# Patient Record
Sex: FEMALE
[De-identification: names, ages, dates, MRNs, and addresses within clinical notes are randomized per-mention and may not be internally consistent; named-entity substitution may affect disease eponyms.]

---

## 2020-12-15 PROBLEM — Z00.00 ENCOUNTER FOR PREVENTIVE HEALTH EXAMINATION: Status: ACTIVE | Noted: 2020-12-15

## 2020-12-17 ENCOUNTER — LABORATORY RESULT (OUTPATIENT)
Age: 20
End: 2020-12-17

## 2020-12-17 ENCOUNTER — APPOINTMENT (OUTPATIENT)
Dept: SURGERY | Facility: CLINIC | Age: 20
End: 2020-12-17
Payer: MEDICAID

## 2020-12-17 VITALS
OXYGEN SATURATION: 98 % | WEIGHT: 141 LBS | HEIGHT: 60 IN | DIASTOLIC BLOOD PRESSURE: 69 MMHG | SYSTOLIC BLOOD PRESSURE: 100 MMHG | BODY MASS INDEX: 27.68 KG/M2 | HEART RATE: 75 BPM

## 2020-12-17 DIAGNOSIS — Z83.3 FAMILY HISTORY OF DIABETES MELLITUS: ICD-10-CM

## 2020-12-17 PROCEDURE — 19000 PUNCTURE ASPIR CYST BREAST: CPT

## 2020-12-17 PROCEDURE — 99203 OFFICE O/P NEW LOW 30 MIN: CPT | Mod: 25

## 2020-12-17 PROCEDURE — 99072 ADDL SUPL MATRL&STAF TM PHE: CPT

## 2020-12-17 NOTE — HISTORY OF PRESENT ILLNESS
[de-identified] : Patient is a 20 year  year-old female  who was referred by Dr. Richard Mercedes  with the chief complaint of having a  Right  breast mass for about  2-3 months. She  denies any trauma and She has no nipple discharge. She  denies any fever, night sweats or loss of appetite.    There is no family history of breast carcinoma.   Menarche at age 12 -0 para-0 and her last menstrual period was in 2020 Patient is on no hormonal replacement therapy.    She had a   sonogram of the right   breast on 2020 it was deemed a BIRADS 4.  Right breast ultrasound-guided core needle biopsy of the mass at 8-9:00.  Probably benign right breast mass at 6:00 will need follow-up in 6 months, with final recommendations pending biopsy results [de-identified] :  \par

## 2020-12-17 NOTE — PHYSICAL EXAM
[Alert] : alert [Oriented to Person] : oriented to person [Oriented to Place] : oriented to place [Oriented to Time] : oriented to time [Calm] : calm [de-identified] : She  is alert, well-groomed, and cheerful.\par   [de-identified] : anicteric.  Nasal mucosa pink, septum midline. Oral mucosa pink.  Tongue midline, Pharynx without exudates.\par   [de-identified] : Neck supple. Trachea midline. Thyroid isthmus barely palpable, lobes not felt.\par   [de-identified] :   symmetric breasts with no nipple or skin retraction.  On palpation of her breasts, there is a  right  10 o' clock palpable mass   .   No palpable masses on the left side.  \par Mass is - mobile, Firm,   Smooth,  Well defined.  No palpable lymph nodes.  No palpable supraclavicular masses. Axillas are benign/have palpable nodes.

## 2020-12-17 NOTE — PLAN
[FreeTextEntry1] : Ms. DANIELS is here with palpable right breast mass. She   was told significance of findings, options, risks and benefits were explained.  Informed consent for FNA right breast mass and potential risks, benefits and alternatives (surgical options were discussed including non-surgical options or the option of no surgery) to the planned procedure were discussed in depth.  All surgical options were discussed including non-surgical treatments.  She wishes to proceed with FNA.    4 ml green colored fluid aspirated. no palpable mass after the aspiration.  she tolerated well . specimen sent to cytology. \par Patient advised to seek immediate medical attention with any acute change in symptoms or with the development of any new or worsening symptoms.  Patient's questions and concerns addressed to patient's satisfaction, and patient verbalized an understanding of the information discussed.\par \par

## 2020-12-17 NOTE — CONSULT LETTER
[Dear  ___] : Dear  [unfilled], [Consult Letter:] : I had the pleasure of evaluating your patient, [unfilled]. [Please see my note below.] : Please see my note below. [Consult Closing:] : Thank you very much for allowing me to participate in the care of this patient.  If you have any questions, please do not hesitate to contact me. [Sincerely,] : Sincerely, [FreeTextEntry3] : Asher Bragg MD, FACS

## 2020-12-17 NOTE — DATA REVIEWED
[FreeTextEntry1] : CARTER DANIELS\par Date of Birth\par 2000\par Procedure\par US DIAGNOSTIC BREAST RIGHT\par Study Date & Time\par 2020-11-18 12:49 PM\par Patient ID\par [id number]\par Accession Number\par [id number]\par Referring Physician\par RONDA RAMOS\par Institution Name\par [facility]\par Report\par RADIOLOGY REPORT\par FINDINGS\par FINDING\par BREAST ULTRASOUND\par Clinical History: 20-year-old woman with palpable lump in the right breast.\par Comparison: None available\par Technique: Complete right breast ultrasound was performed, including all 4 quadrants, retroareolar\par region and the axilla.\par Findings:\par The breast parenchyma is heterogeneously glandular.\par Right breast:\par 6:00 N1, 0.4 x 0.2 x 0.5 cm circumscribed parallel hypoechoic mass, probably benign.\par 8-9:00 N3, 2.9 x 2.5 x 3.4 cm irregular hypoechoic mass with internal cystic focus, indeterminate.\par Normal axillary lymph nodes.\par IMPRESSION:\par Indeterminate right breast mass at 8-9:00\par Probably benign right breast mass at 6:00\par RECOMMENDATION:\par Right breast ultrasound-guided core needle biopsy of the mass at 8-9:00.\par Probably benign right breast mass at 6:00 will need follow-up in 6 months, with final\par recommendations pending biopsy results.\par BI-RADS: 4 - Suspicious abnormality - Biopsy Should Be Considered.\par 12/15/2020 [employer] [employer]\par [email or web address]\par The above findings and recommendations were relayed to Anjana in the office of Dr. Ramos by\par telephone on: 11/18/2020 1:35 PM.\par Electronically signed by: Kaveh Anne MD 11/18/2020 2:14 PM\par Workstation: [id number]\par Electronically Signed By: Kaveh Anne MD\par Sign Date: 18-NOV-20\[facility] Radiology

## 2021-01-21 ENCOUNTER — APPOINTMENT (OUTPATIENT)
Dept: SURGERY | Facility: CLINIC | Age: 21
End: 2021-01-21
Payer: MEDICAID

## 2021-01-21 VITALS
SYSTOLIC BLOOD PRESSURE: 109 MMHG | BODY MASS INDEX: 27.48 KG/M2 | DIASTOLIC BLOOD PRESSURE: 74 MMHG | HEART RATE: 83 BPM | WEIGHT: 140 LBS | HEIGHT: 60 IN

## 2021-01-21 PROCEDURE — 99072 ADDL SUPL MATRL&STAF TM PHE: CPT

## 2021-01-21 PROCEDURE — 99213 OFFICE O/P EST LOW 20 MIN: CPT | Mod: 25

## 2021-01-21 PROCEDURE — 19000 PUNCTURE ASPIR CYST BREAST: CPT

## 2021-01-21 NOTE — PHYSICAL EXAM
[Alert] : alert [Oriented to Person] : oriented to person [Oriented to Place] : oriented to place [Oriented to Time] : oriented to time [Calm] : calm [de-identified] : She  is alert, well-groomed, and cheerful.\par   [de-identified] : anicteric.  Nasal mucosa pink, septum midline. Oral mucosa pink.  Tongue midline, Pharynx without exudates.\par   [de-identified] : Neck supple. Trachea midline. Thyroid isthmus barely palpable, lobes not felt.\par   [de-identified] :   symmetric breasts with no nipple or skin retraction.  On palpation of her breasts, there is a  right  10 o' clock palpable mass   .   No palpable masses on the left side.  \par Mass is - mobile, Firm,   Smooth,  Well defined.  No palpable lymph nodes.  No palpable supraclavicular masses. Axillas are benign/have palpable nodes.

## 2021-01-21 NOTE — PLAN
[FreeTextEntry1] : Ms. DANIELS is here with palpable  recurrent right breast cyst . She was told significance of findings, options, risks and benefits were explained. Informed consent for FNA right breast mass and potential risks, benefits and alternatives (surgical options were discussed including non-surgical options or the option of no surgery) to the planned procedure were discussed in depth. All surgical options were discussed including non-surgical treatments. She wishes to proceed with FNA. 9   ml green colored fluid aspirated. no palpable residual mass after the aspiration. she tolerated well.  \par Patient advised to seek immediate medical attention with any acute change in symptoms or with the development of any new or worsening symptoms.  Patient's questions and concerns addressed to patient's satisfaction, and patient verbalized an understanding of the information discussed. patient will follow up in 2  months or if needed.  \par  \par Vitamin E daily for breast pain \par Avoid caffein and Chocolates to prevent breast pain

## 2021-01-21 NOTE — HISTORY OF PRESENT ILLNESS
[de-identified] : Patient is a 20 year year-old female who was referred by Dr. Richard Mercedes with the chief complaint of having a Right breast mass on 2020. Ms. DANIELS  is s/p FNA right breast cyst. Patient's  cytology results were  consistent with breast cyst contents, negative for malignancy.  she is returning today with cc of having a cyst recurrence in her right breast. she reports pain \par \par  Menarche at age 12 -0 para-0 and her last menstrual period was in 2020 Patient is on no hormonal replacement therapy. She had a sonogram of the right breast on 2020 it was deemed a BIRADS 4. Right breast ultrasound-guided core needle biopsy of the mass at 8-9:00. Probably benign right breast mass at 6:00 will need follow-up in 6 months, with final recommendations pending biopsy results.

## 2021-01-21 NOTE — DATA REVIEWED
[FreeTextEntry1] : CARTER DANIELS\par Date of Birth\par 2000\par Procedure\par US DIAGNOSTIC BREAST RIGHT\par Study Date & Time\par 2020-11-18 12:49 PM\par Patient ID\par [id number]\par Accession Number\par [id number]\par Referring Physician\par RONDA RAMOS\par Institution Name\par [facility]\par Report\par RADIOLOGY REPORT\par FINDINGS\par FINDING\par BREAST ULTRASOUND\par Clinical History: 20-year-old woman with palpable lump in the right breast.\par Comparison: None available\par Technique: Complete right breast ultrasound was performed, including all 4 quadrants, retroareolar\par region and the axilla.\par Findings:\par The breast parenchyma is heterogeneously glandular.\par Right breast:\par 6:00 N1, 0.4 x 0.2 x 0.5 cm circumscribed parallel hypoechoic mass, probably benign.\par 8-9:00 N3, 2.9 x 2.5 x 3.4 cm irregular hypoechoic mass with internal cystic focus, indeterminate.\par Normal axillary lymph nodes.\par IMPRESSION:\par Indeterminate right breast mass at 8-9:00\par Probably benign right breast mass at 6:00\par RECOMMENDATION:\par Right breast ultrasound-guided core needle biopsy of the mass at 8-9:00.\par Probably benign right breast mass at 6:00 will need follow-up in 6 months, with final\par recommendations pending biopsy results.\par BI-RADS: 4 - Suspicious abnormality - Biopsy Should Be Considered.\par 12/15/2020 [employer]\par [email or web address] 2/[email or web address]\par The above findings and recommendations were relayed to Anjana in the office of Dr. Ramos by\par telephone on: 11/18/2020 1:35 PM.\par Electronically signed by: Kaveh Anne MD 11/18/2020 2:14 PM\par Workstation: [id number]\par Electronically Signed By: Kaveh Anne MD\par Sign Date: 18-NOV-20\[facility] Radiology

## 2021-03-18 ENCOUNTER — APPOINTMENT (OUTPATIENT)
Dept: SURGERY | Facility: CLINIC | Age: 21
End: 2021-03-18
Payer: MEDICAID

## 2021-03-18 VITALS
DIASTOLIC BLOOD PRESSURE: 55 MMHG | WEIGHT: 139 LBS | HEART RATE: 66 BPM | HEIGHT: 60 IN | BODY MASS INDEX: 27.29 KG/M2 | SYSTOLIC BLOOD PRESSURE: 91 MMHG

## 2021-03-18 DIAGNOSIS — N63.10 UNSPECIFIED LUMP IN THE RIGHT BREAST, UNSPECIFIED QUADRANT: ICD-10-CM

## 2021-03-18 PROCEDURE — 99072 ADDL SUPL MATRL&STAF TM PHE: CPT

## 2021-03-18 PROCEDURE — 10060 I&D ABSCESS SIMPLE/SINGLE: CPT

## 2021-03-18 PROCEDURE — 99213 OFFICE O/P EST LOW 20 MIN: CPT | Mod: 25

## 2021-03-18 RX ORDER — CEPHALEXIN 500 MG/1
500 CAPSULE ORAL 3 TIMES DAILY
Qty: 21 | Refills: 0 | Status: ACTIVE | COMMUNITY
Start: 2021-03-18 | End: 1900-01-01

## 2021-03-18 NOTE — VITALS
[de-identified] : 8/10 [FreeTextEntry3] : right breast  [FreeTextEntry1] : nothing  [FreeTextEntry2] : touching

## 2021-03-18 NOTE — HISTORY OF PRESENT ILLNESS
[de-identified] : Patient is a 20 year year-old female who was referred by Dr. Richard Mercedes with the chief complaint of having a Right breast mass on 2020. Ms. DANIELS  is s/p FNA right breast cyst. Patient's  cytology results were  consistent with breast cyst contents, negative for malignancy.  she is returning today with  a painful lump  in her right breast. she reports pain. \par \par  Menarche at age 12 -0 para-0 and her last menstrual period was in 2020 Patient is on no hormonal replacement therapy. She had a sonogram of the right breast on 2020 it was deemed a BIRADS 4. Right breast ultrasound-guided core needle biopsy of the mass at 8-9:00. Probably benign right breast mass at 6:00 will need follow-up in 6 months, with final recommendations pending biopsy results.

## 2021-03-18 NOTE — PHYSICAL EXAM
[Alert] : alert [Oriented to Person] : oriented to person [Oriented to Place] : oriented to place [Oriented to Time] : oriented to time [Calm] : calm [de-identified] : She  is alert, well-groomed, and cheerful.\par   [de-identified] : anicteric.  Nasal mucosa pink, septum midline. Oral mucosa pink.  Tongue midline, Pharynx without exudates.\par   [de-identified] : Neck supple. Trachea midline. Thyroid isthmus barely palpable, lobes not felt.\par   [de-identified] :   symmetric breasts with no nipple or skin retraction.  On palpation of her breasts, there is a  right  7 o' clock palpable mass   .   No palpable masses on the left side.  \par Mass is - mobile, Firm,   Smooth,  Well defined.  No palpable lymph nodes.  No palpable supraclavicular masses. Axillas are benign/have palpable nodes.

## 2021-03-18 NOTE — PLAN
[FreeTextEntry1] : Ms. DANIELS  was told significance of findings, options, risks and benefits were explained.    All surgical options were discussed including non-surgical treatments.   abscess was aspirated using 18 g needle. sent for cultures. she will return after one week.  Po keflex for 1 week  \par Patient advised to seek immediate medical attention with any acute change in symptoms or with the development of any new or worsening symptoms.  Patient's questions and concerns addressed to patient's satisfaction, and patient verbalized an understanding of the information discussed.\par \par

## 2021-03-25 ENCOUNTER — APPOINTMENT (OUTPATIENT)
Dept: SURGERY | Facility: CLINIC | Age: 21
End: 2021-03-25
Payer: MEDICAID

## 2021-03-25 VITALS
WEIGHT: 139 LBS | SYSTOLIC BLOOD PRESSURE: 85 MMHG | HEIGHT: 60 IN | BODY MASS INDEX: 27.29 KG/M2 | HEART RATE: 60 BPM | DIASTOLIC BLOOD PRESSURE: 50 MMHG

## 2021-03-25 LAB — BACTERIA WND CULT: ABNORMAL

## 2021-03-25 PROCEDURE — 99024 POSTOP FOLLOW-UP VISIT: CPT

## 2021-03-25 NOTE — PHYSICAL EXAM
[Alert] : alert [Oriented to Person] : oriented to person [Oriented to Place] : oriented to place [Oriented to Time] : oriented to time [Calm] : calm [de-identified] : She  is alert, well-groomed, and cheerful.\par   [de-identified] : anicteric.  Nasal mucosa pink, septum midline. Oral mucosa pink.  Tongue midline, Pharynx without exudates.\par   [de-identified] : Neck supple. Trachea midline. Thyroid isthmus barely palpable, lobes not felt.\par   [de-identified] :   symmetric breasts with no nipple or skin retraction.  On palpation of her breasts, there is a  right  7 o' clock palpable mass. Red slightly   .   No palpable masses on the left side.  \par Mass is - mobile, Firm,   Smooth,  Well defined.  No palpable lymph nodes.  No palpable supraclavicular masses. Axillas are benign/have palpable nodes.

## 2021-03-25 NOTE — HISTORY OF PRESENT ILLNESS
[de-identified] : Patient is a 20 year year-old female who was referred by Dr. Richard Mercedes with the chief complaint of having a Right breast mass on 2020. Ms. DANIELS  is s/p FNA right breast cyst. Patient's  cytology results were  consistent with breast cyst contents, negative for malignancy. abscess was aspirated using 18 g needle. sent for cultures. she will return after one week. Po keflex for 1 week . 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 Susceptibilities not performed.  today she returns with a cc of having increase in  redness in the right breast with small amount of  purulent drainage.  No fever or chills \par PERTINENT HISTORY: \par  Menarche at age 12 -0 para-0 and her last menstrual period was in 2020 Patient is on no hormonal replacement therapy. She had a sonogram of the right breast on 2020 it was deemed a BIRADS 4. Right breast ultrasound-guided core needle biopsy of the mass at 8-9:00. Probably benign right breast mass at 6:00 will need follow-up in 6 months, with final recommendations pending biopsy results.

## 2021-03-25 NOTE — PLAN
[FreeTextEntry1] : Ms. DANIELS  was told significance of findings, options, risks and benefits were explained.    All surgical options were discussed including non-surgical treatments.   cultures reviewed in detail. patient was advised to take Augmentin for 10 days BID and return in 2 weeks. \par Patient advised to seek immediate medical attention with any acute change in symptoms or with the development of any new or worsening symptoms.  Patient's questions and concerns addressed to patient's satisfaction, and patient verbalized an understanding of the information discussed.\par \par

## 2021-04-08 ENCOUNTER — APPOINTMENT (OUTPATIENT)
Dept: SURGERY | Facility: CLINIC | Age: 21
End: 2021-04-08

## 2021-04-15 ENCOUNTER — APPOINTMENT (OUTPATIENT)
Dept: SURGERY | Facility: CLINIC | Age: 21
End: 2021-04-15
Payer: MEDICAID

## 2021-04-15 VITALS
BODY MASS INDEX: 27.29 KG/M2 | HEART RATE: 90 BPM | SYSTOLIC BLOOD PRESSURE: 105 MMHG | DIASTOLIC BLOOD PRESSURE: 71 MMHG | WEIGHT: 139 LBS | HEIGHT: 60 IN

## 2021-04-15 PROCEDURE — 99213 OFFICE O/P EST LOW 20 MIN: CPT | Mod: 25

## 2021-04-15 PROCEDURE — 99072 ADDL SUPL MATRL&STAF TM PHE: CPT

## 2021-04-15 PROCEDURE — 19000 PUNCTURE ASPIR CYST BREAST: CPT

## 2021-04-15 RX ORDER — AMOXICILLIN AND CLAVULANATE POTASSIUM 875; 125 MG/1; MG/1
875-125 TABLET, COATED ORAL
Qty: 20 | Refills: 0 | Status: ACTIVE | COMMUNITY
Start: 2021-03-25 | End: 1900-01-01

## 2021-04-15 NOTE — PLAN
[FreeTextEntry1] : Ms. DANIELS  was told significance of findings, options, risks and benefits were explained.    All surgical options were discussed including non-surgical treatments.   she was advised to have the cyst aspirated. 4 ml dark yellow/orange fluid was aspirated.  she was advised to take PO antibiotics again for 10 days and return in 2 weeks. \par Patient advised to seek immediate medical attention with any acute change in symptoms or with the development of any new or worsening symptoms.  Patient's questions and concerns addressed to patient's satisfaction, and patient verbalized an understanding of the information discussed.\par \par

## 2021-04-15 NOTE — PHYSICAL EXAM
[Alert] : alert [Oriented to Person] : oriented to person [Oriented to Place] : oriented to place [Oriented to Time] : oriented to time [Calm] : calm [de-identified] : She  is alert, well-groomed, and cheerful.\par   [de-identified] : anicteric.  Nasal mucosa pink, septum midline. Oral mucosa pink.  Tongue midline, Pharynx without exudates.\par   [de-identified] : Neck supple. Trachea midline. Thyroid isthmus barely palpable, lobes not felt.\par   [de-identified] :   symmetric breasts with no nipple or skin retraction.  On palpation of her breasts, there is a  right  9 o' clock palpable mass.   No palpable masses on the left side.  \par Mass is - mobile, Firm,   Smooth,  Well defined.  No palpable lymph nodes.  No palpable supraclavicular masses. Axillas are benign/have palpable nodes.

## 2021-04-15 NOTE — HISTORY OF PRESENT ILLNESS
[de-identified] : Patient is a 20 year year-old female who was referred by Dr. Richard Mercedes with the chief complaint of having a Right breast mass on 2020. Ms. DANIELS  is s/p FNA right breast cyst. Patient's  cytology results were  consistent with breast cyst contents, negative for malignancy. abscess was aspirated using 18 g needle. sent for cultures.  Patient's cultures results were consistent with Rare Propionibacterium acnes. patient was advised to take Augmentin for 10 days BID and return in 2 weeks.  she is returning today with cc of having palpable mass in her right breast. area of the previous abscess has healed and she has no drainage.. \par \par PERTINENT HISTORY: \par  Menarche at age 12 -0 para-0 and her last menstrual period was in 2020 Patient is on no hormonal replacement therapy. She had a sonogram of the right breast on 2020 it was deemed a BIRADS 4. Right breast ultrasound-guided core needle biopsy of the mass at 8-9:00. Probably benign right breast mass at 6:00 will need follow-up in 6 months, with final recommendations pending biopsy results.

## 2021-04-22 PROBLEM — N61.1 BREAST ABSCESS OF FEMALE: Status: ACTIVE | Noted: 2021-03-18

## 2021-04-22 NOTE — HISTORY OF PRESENT ILLNESS
[de-identified] :  Patient is a 20 year year-old female who was referred by Dr. Richard Mercedes with the chief complaint of having a Right breast mass on 2020. Ms. DANIELS is s/p FNA right breast cyst. Patient's cytology results were consistent with breast cyst contents, negative for malignancy. abscess was aspirated using 18 g needle. sent for cultures. Patient's cultures results were consistent with Rare Propionibacterium acnes. patient  took  Augmentin for 10 days BID  and the abscess had resolved. she returned to the office on  04/15/2021 with a different  palpable mass in her right breast. she was advised to repeat a course of Augmentin. \par \par PERTINENT HISTORY: \par  Menarche at age 12 -0 para-0 and her last menstrual period was in 2020 Patient is on no hormonal replacement therapy. She had a sonogram of the right breast on 2020 it was deemed a BIRADS 4. Right breast ultrasound-guided core needle biopsy of the mass at 8-9:00. Probably benign right breast mass at 6:00 will need follow-up in 6 months, with final recommendations pending biopsy results.

## 2021-04-29 ENCOUNTER — APPOINTMENT (OUTPATIENT)
Dept: SURGERY | Facility: CLINIC | Age: 21
End: 2021-04-29

## 2021-04-29 DIAGNOSIS — N61.1 ABSCESS OF THE BREAST AND NIPPLE: ICD-10-CM

## 2021-05-10 LAB — BACTERIA FLD CULT: NORMAL
